# Patient Record
(demographics unavailable — no encounter records)

---

## 2017-05-15 NOTE — PCM.HP
H&P History of Present Illness





- General


Date of Service: 05/15/17


Admit Problem/Dx: 


 Admission Diagnosis/Problem





Admission Diagnosis/Problem      Abdominal pain











- History of Present Illness


Initial Comments - Free Text/Narative: 





He was seen in the ED today with complaint of abdominal pain and recurrent 

vomiting. He states that he does not have so much pain ; it is more a bloated 

feeling.





no fever


no diarrhea


no dysuria





  ** Abdominal


Pain Score (Numeric/FACES): 5





- Related Data


Allergies/Adverse Reactions: 


 Allergies











Allergy/AdvReac Type Severity Reaction Status Date / Time


 


COCA COLA Allergy  Hives Uncoded 10/01/15 13:07











Home Medications: 


 Home Meds





Lisinopril [Prinivil] 10 mg PO DAILY 09/12/15 [History]











Past Medical History


Cardiovascular History: Reports: Hypertension.  Denies: Afib, CAD, 

Cardiomyopathy, Heart Failure


Respiratory History: Denies: COPD


Gastrointestinal History: Denies: Cirrhosis


Genitourinary History: Reports: Other (See Below)


Other Genitourinary History: hematuria once, none since


Musculoskeletal History: Denies: Muscular Dystrophy, RA, SLE


Neurological History: Denies: CVA, MS


Psychiatric History: Reports: Anxiety


Endocrine/Metabolic History: Denies: Diabetes, Type I, Diabetes, Type II


Oncologic (Cancer) History: Reports: None





- Past Surgical History


Head Surgeries/Procedures: Reports: None


Male  Surgical History: Reports: None





Social & Family History





- Family History


Cardiac: Reports: MI


Respiratory: Reports: None


GI: Reports: None


Endocrine/Metabolic: Reports: Diabetes, type II


Oncologic: Reports: Colon





- Tobacco Use


Smoking Status *Q: Never Smoker


Second Hand Smoke Exposure: No





- Caffeine Use


Caffeine Use: Reports: Soda, Tea





- Alcohol Use


Alcohol Use Comment: he states that he does not drink alcohol





- Recreational Drug Use


Recreational Drug Use: No





H&P Review of Systems





- Review of Systems:


Review Of Systems: See Below


General: Denies: Fever


HEENT: Denies: Ear Pain, Sore Throat


Pulmonary: Denies: Shortness of Breath, Cough, Sputum, Hemoptysis


Cardiovascular: Denies: Chest Pain, Palpitations, Edema, Syncope


Gastrointestinal: Reports: Abdominal Pain, Distension, Nausea, Vomiting.  Denies

: Black Stool, Bloody Stool, Hematochezia


Genitourinary: Denies: Dysuria, Frequency, Burning, Hematuria


Psychiatric: Denies: Confusion, Hallucinations





Exam





- Exam


Exam: See Below





- Vital Signs


Vital Signs: 


 Last Vital Signs











Temp  98.0 F   05/15/17 20:17


 


Pulse  67   05/15/17 20:17


 


Resp  18   05/15/17 20:17


 


BP  169/81 H  05/15/17 20:17


 


Pulse Ox  96   05/15/17 20:17











Weight: 122.6 kg





- Exam


General: Alert, Oriented, Cooperative


HEENT: EOMI, Mucosa Moist & Pink


Neck: Supple, Trachea Midline


Lungs: Clear to Auscultation, Normal Respiratory Effort.  No: Crackles, Rales, 

Rhonchi, Rub, Stridor


Cardiovascular: Regular Rate, Regular Rhythm


Abdomen: Distention, Tenderness (minimal diffuse tenderness), Hypoactive Bowel 

Sounds.  No: Guarding, Rigidity, Rebound, Tympanic Bowel Sounds


 (Male) Exam: Deferred


Extremities: No: Edema


Neurological: Cranial Nerves Intact, Normal Speech


Neuro Extensive - Mental Status: Normal Mood/Affect


Neuro Extensive - Motor, Sensory, Reflexes: No: Facial palsy (L), Facial Palsy (

R), Hemeplagia (R), Hemeplagia (L)


Psychiatric: Alert, Normal Affect





- Patient Data


Result Diagrams: 


 05/15/17 18:20








*Q Meaningful Use (ADM)





- VTE *Q


VTE Criteria *Q: 








- Stroke *Q


Stroke Criteria *Q: 








- AMI *Q


AMI Criteria *Q: 








- Problem List


(1) Abdominal pain


SNOMED Code(s): 10013574


   ICD Code: R10.9 - UNSPECIFIED ABDOMINAL PAIN   Status: Acute   Current Visit

: Yes   


Qualifiers: 


   Abdominal location: generalized   Qualified Code(s): R10.84 - Generalized 

abdominal pain   


Problem List Initiated/Reviewed/Updated: Yes


Assessment/Plan Comment:: 





 He may have an ileus.


will admit and give bowel rest with IVF; may need NG tube


will need chemistries, LFT's , abdominal ultrasound, lipase, close monitoring.





Dandy Poe MD

## 2017-05-15 NOTE — EDM.PDOC
ED HPI GENERAL MEDICAL PROBLEM





- General


Chief Complaint: Abdominal Pain


Stated Complaint: PT HAS STOMACH PAINS


Time Seen by Provider: 05/15/17 18:05


Source of Information: Reports: Patient, Old Records


History Limitations: Reports: No Limitations





- History of Present Illness


INITIAL COMMENTS - FREE TEXT/NARRATIVE: 


HISTORY AND PHYSICAL:





History of present illness:


[Pt comes to the ER, referred by Dr. Connelly, his PCP in [place]. Patient 

developed abdominal pain and bloating last evening which has continued into 

today. He ate supper at 1900 hours last night but then started with nausea 

abdominal pain and bloating. He had 5 episodes of vomiting during the night she 

continued into today. He is vomiting up water. His passing very small amount of 

gas. He had 1 episode of vomiting prior to arrival in this ER. He's not had any 

chills but has admitted to episodes of feeling hot with sweating. No blurred 

vision or double vision. No sore throat chest pain difficulty breathing change 

in bowel or bladder. No pain to his extremities no weakness in the rashes. He 

denies any history of abdominal pain or surgeries. Past medical history 

significant for hypertension and hematuria. No BM or change in his symptoms 

since evaluation by PCP. ]





Review of systems: 


As per history of present illness and below otherwise all systems reviewed and 

negative.





Past medical history: 


As per history of present illness and as reviewed below otherwise 

noncontributory.





Surgical history: 


As per history of present illness and as reviewed below otherwise 

noncontributory.





Social history: 


No reported history of drug or alcohol abuse.





Family history: 


As per history of present illness and as reviewed below otherwise 

noncontributory.





Physical exam:


HEENT: Atraumatic, normocephalic. Oral mucous membranes moist, throat clear, 

neck supple, nontender. .


Lungs: Clear to auscultation, breath sounds equal bilaterally, chest nontender.


Heart: S1S2, regular, negative for clicks, rubs, or JVD.


Abdomen: Bowel sounds are hypoactive. Abdomen is somewhat firm and distended. 

No rebound guarding or masses.


Pelvis: Stable nontender.


Genitourinary: Deferred.


Rectal: Deferred.


Extremities: No swelling or cyanosis to the lower legs. Neurovascular 

unremarkable.


Neuro: Awake, alert, oriented. Motor and sensory unremarkable throughout. Exam 

nonfocal.





Diagnostics:


[CT abdomen and pelvis w/ contrast, CBC, lactic acid, blood cultures x 2]





Therapeutics:


[none.]





Impression: 


[abdominal pain]





Plan:


[Dr. Connelly reports that air-fluid levels are appreciated on abdominal x-ray 

which is performed in clinic morning. CBC shows a white count of 13.7. 

hemoglobin 15.5. RBCs 5.35 platelets 228.0. Sodium 143. potassium 4.2. liver 

enzymes are within normal limits. BUN 10. creatinine 1.03. GFR greater than 60. 

Lactic acid is normal at 1.9. 


CT shows distention of small bowel in the L mid abdomen w/ mesenteric edema and 

minimal free fluid. Differential includes enteritis and partial or early small 

bowel obstruction. Discussed patient's CT findings with Dr. Poe who agrees 

to accept patient in patient. This is discussed with patient who is in 

agreement with inpatient hospitalization.


]





Definitive disposition and diagnosis as appropriate pending reevaluation and 

review of above.








  ** Abdominal


Pain Score (Numeric/FACES): 5





- Related Data


 Allergies











Allergy/AdvReac Type Severity Reaction Status Date / Time


 


COCA COLA Allergy  Hives Uncoded 10/01/15 13:07











Home Meds: 


 Home Meds





Lisinopril [Prinivil] 10 mg PO DAILY 09/12/15 [History]











Social & Family History





- Tobacco Use


Smoking Status *Q: Never Smoker


Second Hand Smoke Exposure: No





- Caffeine Use


Caffeine Use: Reports: None





- Recreational Drug Use


Recreational Drug Use: No





ED ROS GENERAL





- Review of Systems


Review Of Systems: ROS reveals no pertinent complaints other than HPI.





ED EXAM, GI/ABD





- Physical Exam


Exam: See Below





Course





- Vital Signs


Last Recorded V/S: 


 Last Vital Signs











Temp  98.1 F   05/15/17 19:21


 


Pulse  63   05/15/17 19:21


 


Resp  16   05/15/17 19:21


 


BP  151/87 H  05/15/17 19:21


 


Pulse Ox  98   05/15/17 19:21














- Orders/Labs/Meds


Orders: 


 Active Orders 24 hr











 Category Date Time Status


 


 Abdomen Pelvis w Cont [CT] Stat Exams  05/15/17 17:39 Taken


 


 CULTURE BLOOD [BC] Stat Lab  05/15/17 19:34 Received


 


 CULTURE BLOOD [BC] Stat Lab  05/15/17 19:38 Received


 


 Blood Culture x2 Reflex Set [OM.PC] Stat Oth  05/15/17 19:24 Ordered











Labs: 


 Laboratory Tests











  05/15/17 05/15/17 Range/Units





  18:20 18:20 


 


WBC  12.66 H   (4.0-11.0)  K/uL


 


RBC  5.24   (4.50-5.90)  M/uL


 


Hgb  16.0   (13.0-17.0)  g/dL


 


Hct  45.6   (38.0-50.0)  %


 


MCV  87.0   (80.0-98.0)  fL


 


MCH  30.5   (27.0-32.0)  pg


 


MCHC  35.1   (31.0-37.0)  g/dL


 


RDW Std Deviation  42.7   (28.0-62.0)  fl


 


RDW Coeff of Vin  14   (11.0-15.0)  %


 


Plt Count  216   (150-400)  K/uL


 


MPV  9.80   (7.40-12.00)  fL


 


Neut % (Auto)  72.3   (48.0-80.0)  %


 


Lymph % (Auto)  20.2   (16.0-40.0)  %


 


Mono % (Auto)  6.3   (0.0-15.0)  %


 


Eos % (Auto)  0.6   (0.0-7.0)  %


 


Baso % (Auto)  0.6   (0.0-1.5)  %


 


Neut # (Auto)  9.1 H   (1.4-5.7)  K/uL


 


Lymph # (Auto)  2.6 H   (0.6-2.4)  K/uL


 


Mono # (Auto)  0.8   (0.0-0.8)  K/uL


 


Eos # (Auto)  0.1   (0.0-0.7)  K/uL


 


Baso # (Auto)  0.1   (0.0-0.1)  K/uL


 


Nucleated RBC %  0.0   /100WBC


 


Nucleated RBCs #  0   K/uL


 


Lactate   1.9  (0.20-2.00)  mmol/L











Meds: 


Medications














Discontinued Medications














Generic Name Dose Route Start Last Admin





  Trade Name Freq  PRN Reason Stop Dose Admin


 


Iopamidol  100 ml  05/15/17 18:14  05/15/17 18:32





  Isovue Multipack-370 (76%)  IVPUSH  05/15/17 18:15  100 ml





  ONETIME STA   Administration














Departure





- Departure


Time of Disposition: 19:55


Disposition: Admitted As Inpatient 66


Condition: good


Clinical Impression: 


Abdominal pain


Qualifiers:


 Abdominal location: generalized Qualified Code(s): R10.84 - Generalized 

abdominal pain








- Discharge Information


Forms:  ED Department Discharge





- My Orders


Last 24 Hours: 


My Active Orders





05/15/17 17:39


Abdomen Pelvis w Cont [CT] Stat 





05/15/17 19:24


Blood Culture x2 Reflex Set [OM.PC] Stat 





05/15/17 19:34


CULTURE BLOOD [BC] Stat 





05/15/17 19:38


CULTURE BLOOD [BC] Stat 














- Assessment/Plan


Last 24 Hours: 


My Active Orders





05/15/17 17:39


Abdomen Pelvis w Cont [CT] Stat 





05/15/17 19:24


Blood Culture x2 Reflex Set [OM.PC] Stat 





05/15/17 19:34


CULTURE BLOOD [BC] Stat 





05/15/17 19:38


CULTURE BLOOD [BC] Stat

## 2017-05-16 NOTE — CT
EXAM DATE: 05/15/17



PATIENT'S AGE: 48





Patient: SHER REYNOLDS



Facility: [place], [place]

Patient ID: [id number]

Site Patient ID: [id number].

Site Accession #: [id number]

: 1968

Study: CT Abdomen/Pelvis [id number]/15/2017 6:34:19 PM

Ordering Physician: Doctor Temp



Final Report: 



INDICATION: Abdominal pain. 



CT ABDOMEN AND PELVIS WITH CONTRAST



TECHNIQUE: Multidetector CT imaging was performed through the abdomen and 
pelvis following intravenous contrast administration using 100 mL Isovue 370. 
Coronal and sagittal reconstructions were generated.



COMPARISON: None.



FINDINGS:



Lower chest: Lung bases are clear.



Liver: Diffuse fatty infiltration of the liver. 



Gallbladder and bile ducts: No gallbladder wall thickening or calcified 
gallstones. No biliary dilation identified.



Pancreas: Unremarkable.



Spleen: Normal.



Adrenals: No nodules or masses.



Kidneys, ureters, and urinary bladder: A few very small right renal cortical 
cysts. No suspicious renal masses or hydronephrosis. Nondistended urinary 
bladder. No bladder mass or definite wall thickening.



Gastrointestinal tract: Moderate abnormal distention of small bowel loops in 
the left mid abdomen with minimal adjacent mesenteric edema. Discrete caliber 
transition not clearly visualized. The appendix and colon are normal.



Vascular structures: Normal for age.



Peritoneum: Small amount of free fluid in the lower pelvis.



Lymph nodes: No pathologically enlarged nodes identified.



Reproductive organs: No pelvic masses.



Bones: Normal for age.



IMPRESSION:

1. Moderate abnormal distention of small bowel in the left mid abdomen with 
minimal adjacent mesenteric edema and minimal free fluid. Differential 
considerations include enteritis and partial or early small bowel obstruction.

2. Fatty infiltration of liver. 



KATE QUIJANO MD

[facility]



Dictated by Demario Quijano MD @ 5/15/2017 7:08:33 PM





Dictated by: Demario Quijano MD @ 05/15/2017 19:10:46

(Electronic Signature)



Report Signed by Proxy.
[facility]

## 2017-05-16 NOTE — PCM.PN
- General Info


Date of Service: 05/16/17


Admission Dx/Problem (Free Text): 


 Admission Diagnosis/Problem





Admission Diagnosis/Problem      Abdominal pain








Subjective Update: 


Patient states that abdominal pain has sightly improved but is still present. 

Currently it is 2/10 and yesterday at admission it was 4-5/10. He just recently 

had an episode of vomiting. He states this is the first time he has ever had 

these symptoms and they began abruptly the day the presented. 


Functional Status: Reports: pain controlled





- Review of Systems


General: Denies: Fever, Weakness


HEENT: Reports: no symptoms


Pulmonary: Reports: no symptoms


Cardiovascular: Reports: No Symptoms


Gastrointestinal: Reports: Abdominal pain, Nausea, Vomiting.  Denies: 

Constipation, Diarrhea


Genitourinary: Reports: no symptoms


Musculoskeletal: Reports: no symptoms


Skin: Reports: no symptoms


Neurological: Reports: No Symptoms





- Patient Data


Vitals - most recent: 


 Last Vital Signs











Temp  37.1 C   05/16/17 08:00


 


Pulse  97   05/16/17 08:00


 


Resp  20   05/16/17 08:00


 


BP  149/74 H  05/16/17 08:24


 


Pulse Ox  91 L  05/16/17 08:00











Weight - most recent: 122.6 kg


I&O - last 24 hours: 


 Intake & Output











 05/15/17 05/16/17 05/16/17





 22:59 06:59 14:59


 


Intake Total  990 


 


Output Total  450 


 


Balance  540 











Lab Results last 24 hrs: 


 Laboratory Results - last 24 hr











  05/16/17 05/16/17 05/16/17 Range/Units





  02:22 04:50 04:50 


 


WBC    9.95  (4.0-11.0)  K/uL


 


RBC    4.96  (4.50-5.90)  M/uL


 


Hgb    14.4  (13.0-17.0)  g/dL


 


Hct    43.7  (38.0-50.0)  %


 


MCV    88.1  (80.0-98.0)  fL


 


MCH    29.0  (27.0-32.0)  pg


 


MCHC    33.0  (31.0-37.0)  g/dL


 


RDW Std Deviation    44.4  (28.0-62.0)  fl


 


RDW Coeff of Vin    14  (11.0-15.0)  %


 


Plt Count    223  (150-400)  K/uL


 


MPV    10.10  (7.40-12.00)  fL


 


Neut % (Auto)    71.4  (48.0-80.0)  %


 


Lymph % (Auto)    19.2  (16.0-40.0)  %


 


Mono % (Auto)    7.9  (0.0-15.0)  %


 


Eos % (Auto)    1.0  (0.0-7.0)  %


 


Baso % (Auto)    0.5  (0.0-1.5)  %


 


Neut # (Auto)    7.1 H  (1.4-5.7)  K/uL


 


Lymph # (Auto)    1.9  (0.6-2.4)  K/uL


 


Mono # (Auto)    0.8  (0.0-0.8)  K/uL


 


Eos # (Auto)    0.1  (0.0-0.7)  K/uL


 


Baso # (Auto)    0.1  (0.0-0.1)  K/uL


 


Nucleated RBC %    0.0  /100WBC


 


Nucleated RBCs #    0  K/uL


 


Sodium   141   (136-146)  mmol/L


 


Potassium   4.3   (3.5-5.1)  mmol/L


 


Chloride   104   ()  mmol/L


 


Carbon Dioxide   26   (21-31)  mmol/L


 


BUN   11   (6.0-23.0)  mg/dL


 


Creatinine   1.0   (0.6-1.5)  mg/dL


 


Est Cr Clr Drug Dosing   102.09   mL/min


 


Estimated GFR (MDRD)   > 60.0   ml/min


 


Glucose   124 H   ()  mg/dL


 


Hemoglobin A1c     (0.0-6.0)  %


 


Calcium   9.2   (8.8-10.8)  mg/dL


 


Magnesium   1.7   (1.5-2.3)  mEq/L


 


Total Bilirubin   1.2   (0.1-1.5)  mg/dL


 


AST   15   (5-40)  IU/L


 


ALT   15   (8-54)  IU/L


 


Alkaline Phosphatase   97   ()  


 


Total Protein   6.7   (6.0-8.0)  g/dL


 


Albumin   4.1   (3.5-5.0)  g/dL


 


Globulin   2.6   (2.0-3.5)  g/dL


 


Albumin/Globulin Ratio   1.6   (1.3-2.8)  


 


Urine Color  YELLOW    


 


Urine Appearance  CLEAR    


 


Urine pH  7.0    (5.0-8.0)  


 


Ur Specific Gravity  <= 1.005    (1.001-1.035)  


 


Urine Protein  NEGATIVE    (NEGATIVE)  mg/dL


 


Urine Glucose (UA)  NEGATIVE    (NEGATIVE)  mg/dL


 


Urine Ketones  NEGATIVE    (NEGATIVE)  mg/dL


 


Urine Occult Blood  MODERATE    (NEGATIVE)  


 


Urine Nitrite  NEGATIVE    (NEGATIVE)  


 


Urine Bilirubin  NEGATIVE    (NEGATIVE)  


 


Urine Urobilinogen  1.0    (<2.0)  EU/dL


 


Ur Leukocyte Esterase  NEGATIVE    (NEGATIVE)  


 


Urine RBC  6-10    (0-2/HPF)  


 


Urine WBC  0-1    (0-5/HPF)  


 


Ur Epithelial Cells  FEW    (NONE-FEW)  


 


Urine Bacteria  RARE    (NEGATIVE)  


 


Urine Mucus  LIGHT    (NONE-MOD)  














  05/16/17 Range/Units





  04:50 


 


WBC   (4.0-11.0)  K/uL


 


RBC   (4.50-5.90)  M/uL


 


Hgb   (13.0-17.0)  g/dL


 


Hct   (38.0-50.0)  %


 


MCV   (80.0-98.0)  fL


 


MCH   (27.0-32.0)  pg


 


MCHC   (31.0-37.0)  g/dL


 


RDW Std Deviation   (28.0-62.0)  fl


 


RDW Coeff of Vin   (11.0-15.0)  %


 


Plt Count   (150-400)  K/uL


 


MPV   (7.40-12.00)  fL


 


Neut % (Auto)   (48.0-80.0)  %


 


Lymph % (Auto)   (16.0-40.0)  %


 


Mono % (Auto)   (0.0-15.0)  %


 


Eos % (Auto)   (0.0-7.0)  %


 


Baso % (Auto)   (0.0-1.5)  %


 


Neut # (Auto)   (1.4-5.7)  K/uL


 


Lymph # (Auto)   (0.6-2.4)  K/uL


 


Mono # (Auto)   (0.0-0.8)  K/uL


 


Eos # (Auto)   (0.0-0.7)  K/uL


 


Baso # (Auto)   (0.0-0.1)  K/uL


 


Nucleated RBC %   /100WBC


 


Nucleated RBCs #   K/uL


 


Sodium   (136-146)  mmol/L


 


Potassium   (3.5-5.1)  mmol/L


 


Chloride   ()  mmol/L


 


Carbon Dioxide   (21-31)  mmol/L


 


BUN   (6.0-23.0)  mg/dL


 


Creatinine   (0.6-1.5)  mg/dL


 


Est Cr Clr Drug Dosing   mL/min


 


Estimated GFR (MDRD)   ml/min


 


Glucose   ()  mg/dL


 


Hemoglobin A1c  6.2 H  (0.0-6.0)  %


 


Calcium   (8.8-10.8)  mg/dL


 


Magnesium   (1.5-2.3)  mEq/L


 


Total Bilirubin   (0.1-1.5)  mg/dL


 


AST   (5-40)  IU/L


 


ALT   (8-54)  IU/L


 


Alkaline Phosphatase   ()  


 


Total Protein   (6.0-8.0)  g/dL


 


Albumin   (3.5-5.0)  g/dL


 


Globulin   (2.0-3.5)  g/dL


 


Albumin/Globulin Ratio   (1.3-2.8)  


 


Urine Color   


 


Urine Appearance   


 


Urine pH   (5.0-8.0)  


 


Ur Specific Gravity   (1.001-1.035)  


 


Urine Protein   (NEGATIVE)  mg/dL


 


Urine Glucose (UA)   (NEGATIVE)  mg/dL


 


Urine Ketones   (NEGATIVE)  mg/dL


 


Urine Occult Blood   (NEGATIVE)  


 


Urine Nitrite   (NEGATIVE)  


 


Urine Bilirubin   (NEGATIVE)  


 


Urine Urobilinogen   (<2.0)  EU/dL


 


Ur Leukocyte Esterase   (NEGATIVE)  


 


Urine RBC   (0-2/HPF)  


 


Urine WBC   (0-5/HPF)  


 


Ur Epithelial Cells   (NONE-FEW)  


 


Urine Bacteria   (NEGATIVE)  


 


Urine Mucus   (NONE-MOD)  











Med Orders - Current: 


 Current Medications





Enalaprilat (Vasotec Iv)  1.25 mg IVPUSH Q6H [facility]


   Last Admin: 05/16/17 08:24 Dose:  1.25 mg


Sodium Chloride (Normal Saline)  1,000 mls @ 150 mls/hr IV ASDIRECTED [facility]


   Last Admin: 05/16/17 03:55 Dose:  150 mls/hr


Lorazepam (Ativan)  1 mg IVPUSH Q6H PRN


   PRN Reason: Anxiety


Ondansetron HCl (Zofran)  4 mg IVPUSH Q4H PRN


   PRN Reason: Nausea


   Last Admin: 05/16/17 02:18 Dose:  4 mg





Discontinued Medications





Fentanyl (Sublimaze)  50 mcg IVPUSH Q1H PRN


   PRN Reason: Pain


   Stop: 05/15/17 21:47


Iopamidol (Isovue Multipack-370 (76%))  100 ml IVPUSH ONETIME STA


   Stop: 05/15/17 18:15


   Last Admin: 05/15/17 18:32 Dose:  100 ml











- Exam


General: alert, oriented, cooperative, no acute distress


HEENT: Pupils equal, Pupils reactive


Neck: supple


Lungs: Clear to auscultation, Normal respiratory effort


Cardiovascular: Regular Rate, Regular Rhythm


Abdomen: bowel sounds present, tenderness, distension.  No: rigidity, rebound, 

guarding


Skin: moist


Neurological: no new focal deficit


Psy/Mental Status: alert, normal affect, normal mood





- Problem List Review


Problem List Initiated/Reviewed/Updated: Yes





- Plan


Plan:: 


1. Nausea, Vomiting and Abdominal pain


-likely secondary to SBO from Ileus


-last BM was 2 days ago


-CT shows distended small bowel loops in LLQ


-Leukocytosis resolved, wbc count 9.95


-LFT, Amylase, Lipase WNL


-continue bowel rest & IVF; may need NG tube


-abdominal US today 





2. Fatty Liver


-seen on CT Abdomen


-hepatic panel wnl


-denies alcohol use 


-abdominal US today 





3. Pre-Diabetes, HbA1C 6.2%


-no previous history of DM


-monitor glucose as usual





4. as per orders

## 2017-05-22 NOTE — [id number]
<Baluch,Marty - Last Filed: 05/22/17 17:54>





**Discharge Summary





- Hospital Course


Free Text/Narrative:: 


47 yo male admitted to the hospital on 5/15/17 for small bowel obstruction. He 

was transferred from from his PCP's clinic due to nausea, vomiting, mild 

abdominal pain and lack of bm for 2 days. Ct Abdomen was done in ED which 

revealed distended small bowel loops. He was put on NPO and given IVF for 

hydration. Patient was relatively stable and abdominal pain was minimal at 

most. On second day he reported improved abdominal pain, with some gas passed 

but no bowel movements. On day 3 his abdominal pain resolved and he had 3 bm's. 

He was monitored to make sure he was tolerating PO Intake which he did. Patient 

was discharged home later that day. [facility]. White. It should also be noted that patient was found to 

have diffuse fatty infiltration of his liver on the CT that was done on day of 

his admission. Abdominal US RUQ was then done which also showed fatty 

infiltration. Patient denies alcohol use. His hepatic panel was completely 

normal. Although this is likely due to obesity patient should have biopsy of 

liver since there is not enough evidence for us to confirm that his fatty liver 

is indeed from obesity. Recommended to patient that he bring this up with his 

pcp and try to schedule Biopsy. I also told the patient that he may see me in 

clinic and i will arrange for him to have the biopsy done.  





Discharge Diagnosis:





1. SBO, secondary to ileus, resolved 





2. Fatty Liver


-seen on CT Abdomen and US abdomen


-hepatic panel wnl


-denies alcohol use


-likely due to obesity but patient should still have Liver Biopsy to rule out 

other potential serious causes of diffuse fatty infiltration of the liver 


 


3. Pre-Diabetes, HbA1C 6.2%


-no previous history of DM














- Discharge Data


Discharge Date: 05/17/17


Discharge Disposition: Home, Self-Care 01


Condition: Good





- Discharge Diagnosis/Problem(s)


(1) Abdominal pain


SNOMED Code(s): 19344651


   ICD Code: R10.9 - UNSPECIFIED ABDOMINAL PAIN   Status: Acute   


Qualifiers: 


   Abdominal location: generalized   Qualified Code(s): R10.84 - Generalized 

abdominal pain   





- Patient Instructions


Diet: Clear Liquid Diet


Activity: As Tolerated


Notify Provider of: Fever, Increased Pain, Swelling and Redness, Drainage, 

Nausea and/or Vomiting


Other/Special Instructions: -avoid tylenol.  -OTC Metamucil 2-3 servings/day.  -

discuss liver biopsy with PCP





- Discharge Plan


Home Medications: 


 Home Meds





Lisinopril [Prinivil] 10 mg PO DAILY 09/12/15 [History]








Patient Handouts:  Abdominal Pain, Adult, Easy-to-Read


Referrals: 


[facility] [Outside]


Sybil White MD [Physician] - 05/24/17 11:00 am





- Discharge Summary/Plan Comment


DC Time >30 min.: No





- Patient Data


Vitals - Most Recent: 


 Last Vital Signs











Temp  36.6 C   05/17/17 15:00


 


Pulse  78   05/17/17 12:00


 


Resp  17   05/17/17 15:00


 


BP  126/91 H  05/17/17 15:12


 


Pulse Ox  95   05/17/17 15:00











Weight - Most Recent: 122.6 kg


Med Orders - Current: 


 Current Medications








Discontinued Medications





Acetaminophen (Tylenol)  650 mg PO Q4H PRN


   PRN Reason: Headache


   Last Admin: 05/17/17 03:52 Dose:  650 mg


Enalaprilat (Vasotec Iv)  1.25 mg IVPUSH Q6H [facility]


   Last Admin: 05/17/17 15:12 Dose:  1.25 mg


Fentanyl (Sublimaze)  50 mcg IVPUSH Q1H PRN


   PRN Reason: Pain


   Stop: 05/15/17 21:47


Sodium Chloride (Normal Saline)  1,000 mls @ 150 mls/hr IV ASDIRECTED [facility]


   Last Admin: 05/17/17 11:30 Dose:  150 mls/hr


Insulin Aspart (Novolog)  0 unit SUBCUT Q6H [facility]


   PRN Reason: Protocol


   Last Admin: 05/16/17 17:29 Dose:  Not Given


Iopamidol (Isovue Multipack-370 (76%))  100 ml IVPUSH ONETIME STA


   Stop: 05/15/17 18:15


   Last Admin: 05/15/17 18:32 Dose:  100 ml


Lorazepam (Ativan)  1 mg IVPUSH Q6H PRN


   PRN Reason: Anxiety


Ondansetron HCl (Zofran)  4 mg IVPUSH Q4H PRN


   PRN Reason: Nausea


   Last Admin: 05/16/17 02:18 Dose:  4 mg











*Q Meaningful Use (DIS)





- VTE *Q


VTE Criteria *Q: 








- Stroke *Q


Stroke Criteria *Q: 








- AMI *Q


AMI Criteria *Q: 








<Patricio Gonzalez - Last Filed: 05/23/17 11:30>





- Patient Data


Vitals - Most Recent: 


 Last Vital Signs











Temp  36.6 C   05/17/17 15:00


 


Pulse  78   05/17/17 12:00


 


Resp  17   05/17/17 15:00


 


BP  126/91 H  05/17/17 15:12


 


Pulse Ox  95   05/17/17 15:00











Med Orders - Current: 


 Current Medications








Discontinued Medications





Acetaminophen (Tylenol)  650 mg PO Q4H PRN


   PRN Reason: Headache


   Last Admin: 05/17/17 03:52 Dose:  650 mg


Enalaprilat (Vasotec Iv)  1.25 mg IVPUSH Q6H [facility]


   Last Admin: 05/17/17 15:12 Dose:  1.25 mg


Fentanyl (Sublimaze)  50 mcg IVPUSH Q1H PRN


   PRN Reason: Pain


   Stop: 05/15/17 21:47


Sodium Chloride (Normal Saline)  1,000 mls @ 150 mls/hr IV ASDIRECTED [facility]


   Last Admin: 05/17/17 11:30 Dose:  150 mls/hr


Insulin Aspart (Novolog)  0 unit SUBCUT Q6H [facility]


   PRN Reason: Protocol


   Last Admin: 05/16/17 17:29 Dose:  Not Given


Iopamidol (Isovue Multipack-370 (76%))  100 ml IVPUSH ONETIME STA


   Stop: 05/15/17 18:15


   Last Admin: 05/15/17 18:32 Dose:  100 ml


Lorazepam (Ativan)  1 mg IVPUSH Q6H PRN


   PRN Reason: Anxiety


Ondansetron HCl (Zofran)  4 mg IVPUSH Q4H PRN


   PRN Reason: Nausea


   Last Admin: 05/16/17 02:18 Dose:  4 mg











*Q Meaningful Use (DIS)





- VTE *Q


VTE Criteria *Q: 








- Stroke *Q


Stroke Criteria *Q: 








- AMI *Q


AMI Criteria *Q: 








- Free Text/Narrative


Note: 





I have examined patient.  I have discussed findings with resident.  I agree 

with the assessment and plan outlined in the following resident's note.